# Patient Record
(demographics unavailable — no encounter records)

---

## 2022-02-28 NOTE — XRAY REPORT
CHEST PA AND LATERAL VIEWS



INDICATION: 

cough, URI X 1 WEEK.



COMPARISON: 

6/28/2018



FINDINGS:



Support devices: None.

Heart: Within normal limits. 

Lungs/Pleura: No acute pulmonary or pleural findings.  







IMPRESSION:

1. No acute findings.



Signer Name: Dony Pacheco MD 

Signed: 2/28/2022 2:12 AM

Workstation Name: [id number]

## 2022-02-28 NOTE — EMERGENCY DEPARTMENT REPORT
- General


Chief Complaint: Upper Respiratory Infection


Stated Complaint: CHEST PAIN/COUGHING


Source: patient


Mode of arrival: Ambulatory


Limitations: No Limitations





- History of Present Illness


Initial Comments: 





Patient is a 40-year-old -American female with no past medical history 

who presented to the ED with complaint of acute onset persistent nasal and sinus

congestion, frontal sinus pressure, persistent dry cough for the last 1 week.  

Patient states that she has been taking over-the-counter medications with no 

relief.  Patient states that she has not been able to sleep because of persisten

t cough.  Patient denies dizziness, syncope, chest pain, shortness of breath, 

sore throat, change in vision, headache, abdominal pain, hemoptysis, 

hematemesis, diarrhea, fever or chills.


MD Complaint: cough, rhinorrhea, nasal congestion, sinus pain


-: Sudden, week(s) (1)


Severity: moderate


Severity scale (0 -10): 5


Quality: dull, aching


Consistency: constant


Improves With: nothing


Worsens With: nothing


Context: sick contacts


Associated Symptoms: headache, rhinorrhea, nasal congestion, cough.  denies: 

fever, chills, myalgias, sore throat, stiff neck, chest pain, shortness of 

breath, abdominal pain, nausea, vomiting, diarrhea, dysuria, rash, confusion, 

right sweats, weight loss, epistaxis, hoarseness, other


Treatments Prior to Arrival: none





- Related Data


                                  Previous Rx's











 Medication  Instructions  Recorded  Last Taken  Type


 


Cyclobenzaprine HCl [Flexeril 5 MG 5 mg PO TID #30 tab 18 Unknown Rx





TAB]    


 


Diclofenac Sodium [Voltaren] 100 gm TP Q6H PRN #1 gel..gram. 18 Unknown Rx


 


Famotidine [Pepcid] 20 mg PO BID #14 tablet 19 Unknown Rx


 


HYDROcodone/APAP 5-325 [[place] 1 each PO Q4HR PRN #14 tablet 19 Unknown Rx





5/325]    


 


predniSONE [Deltasone] 20 mg PO BID #10 tab 19 Unknown Rx


 


Ibuprofen [Motrin 800 MG tab] 800 mg PO Q8HR PRN #30 tablet 19 Unknown Rx


 


Sulfamethoxazole/Trimethoprim 1 each PO BID #6 tablet 19 Unknown Rx





[Bactrim DS TAB]    


 


Azithromycin [Zithromax Z-ELISA] 250 mg PO DAILY #6 tab 22 Unknown Rx


 


Benzonatate [Tessalon Perles] 100 mg PO Q8HR #30 cap 22 Unknown Rx


 


Cetirizine HCl [Zyrtec 10mg tab] 10 mg PO DAILY #30 tab 22 Unknown Rx


 


Ibuprofen [Motrin] 800 mg PO Q8HR PRN #24 tablet 22 Unknown Rx


 


predniSONE [Deltasone] 40 mg PO QDAY #10 tab 22 Unknown Rx











                                    Allergies











Allergy/AdvReac Type Severity Reaction Status Date / Time


 


No Known Allergies Allergy   Verified 19 11:28














ED Review of Systems


ROS: 


Stated complaint: CHEST PAIN/COUGHING


Other details as noted in HPI





Constitutional: denies: chills, fever


Eyes: denies: eye pain, eye discharge, vision change


ENT: congestion, other (Sinus congestion and pressure).  denies: ear pain, 

throat pain


Respiratory: cough.  denies: shortness of breath, wheezing


Cardiovascular: denies: chest pain, palpitations


Endocrine: no symptoms reported


Gastrointestinal: denies: abdominal pain, nausea, diarrhea


Genitourinary: denies: urgency, dysuria, frequency, hematuria, discharge


Musculoskeletal: denies: back pain, joint swelling, arthralgia


Skin: denies: rash, lesions


Neurological: denies: headache, weakness, paresthesias


Psychiatric: denies: anxiety, depression


Hematological/Lymphatic: denies: easy bleeding, easy bruising





ED Past Medical Hx





- Past Medical History


Previous Medical History?: Yes


Additional medical history: KIDNEY STONES.  Obesity





- Surgical History


Past Surgical History?: No





- Social History


Smoking Status: Never Smoker


Substance Use Type: None





- Medications


Home Medications: 


                                Home Medications











 Medication  Instructions  Recorded  Confirmed  Last Taken  Type


 


Cyclobenzaprine HCl [Flexeril 5 MG 5 mg PO TID #30 tab 18  Unknown Rx





TAB]     


 


Diclofenac Sodium [Voltaren] 100 gm TP Q6H PRN #1 gel..gram. 18  Unknown 

Rx


 


Famotidine [Pepcid] 20 mg PO BID #14 tablet 19  Unknown Rx


 


HYDROcodone/APAP 5-325 [[place] 1 each PO Q4HR PRN #14 tablet 19  Unknown Rx





5/325]     


 


predniSONE [Deltasone] 20 mg PO BID #10 tab 19  Unknown Rx


 


Ibuprofen [Motrin 800 MG tab] 800 mg PO Q8HR PRN #30 tablet 19  Unknown Rx


 


Sulfamethoxazole/Trimethoprim 1 each PO BID #6 tablet 19  Unknown Rx





[Bactrim DS TAB]     


 


Azithromycin [Zithromax Z-ELISA] 250 mg PO DAILY #6 tab 22  Unknown Rx


 


Benzonatate [Tessalon Perles] 100 mg PO Q8HR #30 cap 22  Unknown Rx


 


Cetirizine HCl [Zyrtec 10mg tab] 10 mg PO DAILY #30 tab 22  Unknown Rx


 


Ibuprofen [Motrin] 800 mg PO Q8HR PRN #24 tablet 22  Unknown Rx


 


predniSONE [Deltasone] 40 mg PO QDAY #10 tab 22  Unknown Rx














ED Physical Exam





- General


Limitations: No Limitations


General appearance: alert, in no apparent distress





- Head


Head exam: Present: atraumatic, normocephalic, normal inspection





- Eye


Eye exam: Present: normal appearance, PERRL, EOMI


Pupils: Present: normal accommodation





- ENT


ENT exam: Present: normal orophraynx, mucous membranes moist, TM's normal 

bilaterally, normal external ear exam, other (Grossly congested nasal passages)





- Neck


Neck exam: Present: normal inspection, full ROM.  Absent: tenderness





- Respiratory


Respiratory exam: Present: normal lung sounds bilaterally.  Absent: respiratory 

distress, wheezes, rales, rhonchi, stridor, chest wall tenderness, accessory 

muscle use, decreased breath sounds, prolonged expiratory





- Cardiovascular


Cardiovascular Exam: Present: normal rhythm, tachycardia, normal heart sounds.  

Absent: systolic murmur, diastolic murmur, rubs, gallop





- GI/Abdominal


GI/Abdominal exam: Present: soft, normal bowel sounds.  Absent: tenderness, 

guarding, rebound, hyperactive bowel sounds, hypoactive bowel sounds, 

organomegaly





- Extremities Exam


Extremities exam: Present: normal inspection, full ROM, normal capillary refill





- Back Exam


Back exam: Present: normal inspection, full ROM.  Absent: tenderness, CVA 

tenderness (R), CVA tenderness (L), muscle spasm, paraspinal tenderness





- Neurological Exam


Neurological exam: Present: alert, oriented X3, CN II-XII intact, normal gait, 

reflexes normal





- Psychiatric


Psychiatric exam: Present: normal affect, normal mood





- Skin


Skin exam: Present: warm, dry, intact, normal color.  Absent: rash





ED Course


                                   Vital Signs











  22





  01:39


 


Temperature 98.6 F


 


Pulse Rate 100 H


 


Respiratory 16





Rate 


 


Blood Pressure 169/109





[Right] 


 


O2 Sat by Pulse 98





Oximetry 














ED Medical Decision Making





- Radiology Data


Radiology results: report reviewed, image reviewed





[facility]  


                                     [address] [place] [address]  


 


                                            XRay Report   


                                               Signed  


 


Patient: GENOVEVA BLANCA                                                    

            MR#: [id number]  


[id number]          


: 1981                                                                

Acct:[id number]      


 


Age/Sex: 40 / F                                                                

ADM Date: 22     


 


Loc: ED       


Attending Dr:   


 


 


Ordering Physician: MIACH ST  


Date of Service: 22  


Procedure(s): XR chest routine 2V  


Accession Number(s): [id number]  


 


cc: MICAH ST   


 


Fluoro Time In Minutes:   


 


CHEST PA AND LATERAL VIEWS  


 


 INDICATION:   


 cough, URI X 1 WEEK.  


 


 COMPARISON:   


 2018  


 


 FINDINGS:  


 


 Support devices: None.  


 Heart: Within normal limits.   


 Lungs/Pleura: No acute pulmonary or pleural findings.    


 


 


 


 IMPRESSION:  


 1. No acute findings.  


 


 Signer Name: Dony Pacheco MD   


 Signed: 2022 2:12 AM  


 Workstation Name: [id number]   


 


 


Transcribed By: SAUL  


Dictated By: Dony Pacheco MD  


Electronically Authenticated By: Dony Pacheco MD    


Signed Date/Time: 22                                


 


 


 


DD/DT: 22                                                            

  


TD/TT:








- Medical Decision Making





This is a 40-year-old -American female with no past medical history who 

presented to the ED with complaint of acute onset persistent nasal and sinus 

congestion, frontal sinus pressure, persistent dry cough for the last 1 week.  

Patient states that she has been taking over-the-counter medications with no 

relief.  Patient states that she has not been able to sleep because of 

persistent cough.  In the ED, patient is alert and oriented x3 and is not in any

 distress.  Chest x-ray showed no acute cardiopulmonary abnormalities or 

pneumonitis.  Patient was treated in the ED with cough medication and oral 

steroids.  Patient was discharged home on medications and advised to follow-up 

with her primary care physician in 7 to 10 days for reevaluation or return to 

the ED immediately if symptoms get worse.





- Differential Diagnosis


Sinusitis; URI; Bronchitis; Pneumonia


Critical care attestation.: 


If time is entered above; I have spent that time in minutes in the direct care 

of this critically ill patient, excluding procedure time.








ED Disposition


Clinical Impression: 


 Acute upper respiratory infection





Acute bronchitis


Qualifiers:


 Bronchitis organism: other organism Qualified Code(s): J20.8 - Acute bronchitis

 due to other specified organisms





Disposition: 01 HOME / SELF CARE / HOMELESS


Is pt being admited?: No


Does the pt Need Aspirin: No


Condition: Stable


Instructions:  Acute Bronchitis, Adult, Easy-to-Read, Upper Respiratory 

Infection, Adult, Easy-to-Read, Acute Bronchitis (ED)


Additional Instructions: 


The chest x-ray showed no acute cardiopulmonary abnormalities or pneumonitis.  

Your symptoms are likely due to sinusitis versus URI versus bronchitis.  

Therefore take medications with food, drink plenty of fluids and follow-up with 

your primary care physician in 5 to 7 days for reevaluation.  Return to the ED 

immediately if symptoms get worse.


Prescriptions: 


predniSONE [Deltasone] 40 mg PO QDAY #10 tab


Ibuprofen [Motrin] 800 mg PO Q8HR PRN #24 tablet


 PRN Reason: Pain , Severe (7-10)


Benzonatate [Tessalon Perles] 100 mg PO Q8HR #30 cap


Azithromycin [Zithromax Z-ELISA] 250 mg PO DAILY #6 tab


Cetirizine HCl [Zyrtec 10mg tab] 10 mg PO DAILY #30 tab


Referrals: 


[facility] [Provider Group] - 7-10 days


Forms:  Work/School Release Form(ED)


Time of Disposition: 02:35


Print Language: ENGLISH